# Patient Record
Sex: MALE | Race: WHITE
[De-identification: names, ages, dates, MRNs, and addresses within clinical notes are randomized per-mention and may not be internally consistent; named-entity substitution may affect disease eponyms.]

---

## 2020-12-28 ENCOUNTER — HOSPITAL ENCOUNTER (EMERGENCY)
Dept: HOSPITAL 41 - JD.ED | Age: 70
Discharge: HOME | End: 2020-12-28
Payer: MEDICARE

## 2020-12-28 VITALS — DIASTOLIC BLOOD PRESSURE: 80 MMHG | SYSTOLIC BLOOD PRESSURE: 131 MMHG | HEART RATE: 100 BPM

## 2020-12-28 DIAGNOSIS — D72.829: ICD-10-CM

## 2020-12-28 DIAGNOSIS — Z79.899: ICD-10-CM

## 2020-12-28 DIAGNOSIS — R74.8: ICD-10-CM

## 2020-12-28 DIAGNOSIS — R79.82: ICD-10-CM

## 2020-12-28 DIAGNOSIS — J40: Primary | ICD-10-CM

## 2020-12-28 DIAGNOSIS — Z20.828: ICD-10-CM

## 2020-12-28 DIAGNOSIS — R79.1: ICD-10-CM

## 2020-12-28 DIAGNOSIS — I10: ICD-10-CM

## 2020-12-28 LAB — SARS-COV-2 RNA RESP QL NAA+PROBE: NEGATIVE

## 2020-12-28 PROCEDURE — 83605 ASSAY OF LACTIC ACID: CPT

## 2020-12-28 PROCEDURE — 85025 COMPLETE CBC W/AUTO DIFF WBC: CPT

## 2020-12-28 PROCEDURE — 0240U: CPT

## 2020-12-28 PROCEDURE — 71275 CT ANGIOGRAPHY CHEST: CPT

## 2020-12-28 PROCEDURE — 96365 THER/PROPH/DIAG IV INF INIT: CPT

## 2020-12-28 PROCEDURE — 83615 LACTATE (LD) (LDH) ENZYME: CPT

## 2020-12-28 PROCEDURE — 94640 AIRWAY INHALATION TREATMENT: CPT

## 2020-12-28 PROCEDURE — 82728 ASSAY OF FERRITIN: CPT

## 2020-12-28 PROCEDURE — 96375 TX/PRO/DX INJ NEW DRUG ADDON: CPT

## 2020-12-28 PROCEDURE — 85379 FIBRIN DEGRADATION QUANT: CPT

## 2020-12-28 PROCEDURE — 36415 COLL VENOUS BLD VENIPUNCTURE: CPT

## 2020-12-28 PROCEDURE — 71045 X-RAY EXAM CHEST 1 VIEW: CPT

## 2020-12-28 PROCEDURE — 87040 BLOOD CULTURE FOR BACTERIA: CPT

## 2020-12-28 PROCEDURE — 86140 C-REACTIVE PROTEIN: CPT

## 2020-12-28 PROCEDURE — 80053 COMPREHEN METABOLIC PANEL: CPT

## 2020-12-28 PROCEDURE — 99285 EMERGENCY DEPT VISIT HI MDM: CPT

## 2020-12-28 NOTE — CT
CT chest

 

Technique: Multiple axial sections were obtained from above the lung 

apices inferiorly through the lung bases.  Intravenous contrast was 

utilized.  Study was performed as a pulmonary angiogram protocol.

 

Findings: Pulmonary arteries are not optimally opacified for pulmonary

 embolism evaluation.  No gross findings of pulmonary embolism within 

the main pulmonary arteries.  Smaller pulmonary embolism could be 

missed.  

 

Heart is enlarged but shows no pericardial thickening.  Thoracic aorta

 shows no aneurysm.  No mediastinal adenopathy or hilar adenopathy is 

appreciated.  Small cyst is noted within the right lobe of the liver 

measuring 2.6 cm in size.  Liver also shows mild fatty infiltration.  

 

Large calcified gallstone is noted within the gallbladder.  Spleen is 

enlarged extending below the inferior level of the study.  Spleen size

 is difficult to accurately measure since it is not completely seen.

 

Lung window settings were reviewed which show no acute parenchymal 

change.

 

Bone window settings were reviewed which show mild degenerative change

 within the spine.  No acute osseous finding is appreciated.

 

Impression:

1.  Suboptimal opacification of the pulmonary arteries.  No findings 

of pulmonary embolism within the main pulmonary arteries.

2.  Fatty infiltration within the liver with right liver cyst.

3.  Large calcified gallstone within the gallbladder.

4.  Enlarged spleen which is nonspecific.

5.  Other findings believed to be incidental as noted above.

 

Diagnostic code #3

 

I agree with preliminary report from [facility], finalized on 12/28/20, 5:47

 AM CST

## 2020-12-28 NOTE — CR
Chest: Portable view of the chest was obtained.

 

Comparison: No prior chest x-ray is available.

 

Findings: Heart is not enlarged.  Minimal atelectasis is seen off the 

left cardiac apex.  Lungs show no acute parenchymal change.  Bony 

structures are grossly intact.

 

Impression:

1.  Minimal atelectasis.

2.  Nothing acute is otherwise appreciated.

 

Diagnostic code #2

## 2020-12-28 NOTE — EDM.PDOC
ED HPI GENERAL MEDICAL PROBLEM





- General


Chief Complaint: Respiratory Problem


Stated Complaint: [facility]


Time Seen by Provider: 12/28/20 01:50


Source of Information: Reports: Patient, EMS


History Limitations: Reports: No Limitations





- History of Present Illness


INITIAL COMMENTS - FREE TEXT/NARRATIVE: 





The patient presents by [facility] for a cough and shortness of breath. 

Last week he developed cellulitis in his right little finger.  He was on kelfex.

 He then started having a temp and a cough a few days ago.  He has been short of

breath but had no loss of taste or smell.  He has no chest pain.  He has no 

abdominal pain, nausea or vomiting.  His wife has a pulse oximeter and she 

checked it this morning and his oxygen saturations were in the upper 80s.  EMS 

was called and they checked his oxygen saturations and they were in the upper 

90s.  He did well with 2L of oxygen.  He has not been tested for COVID.  He does

have prostate cancer and was treated last year.


Onset: Gradual


Duration: Day(s):


Severity: Moderate


Improves with: Reports: None


Worsens with: Reports: None


Associated Symptoms: Reports: Cough, Fever/Chills, Shortness of Breath.  Denies:

Chest Pain, Headaches, Nausea/Vomiting





- Related Data


                                    Allergies











Allergy/AdvReac Type Severity Reaction Status Date / Time


 


No Known Allergies Allergy   Verified 12/28/20 01:54











Home Meds: 


                                    Home Meds





Losartan Potassium 100 mg PO DAILY 12/28/20 [History]


amLODIPine [Norvasc] 5 mg PO DAILY 12/28/20 [History]


cephALEXin [Keflex] 500 mg PO QID 12/28/20 [History]


hydroCHLOROthiazide [Hydrochlorothiazide] 12.5 mg PO DAILY 12/28/20 [History]


predniSONE [Prednisone] 40 mg PO DAILY #10 tablet 12/28/20 [Rx]











Past Medical History


Cardiovascular History: Reports: Hypertension





- Past Surgical History


Musculoskeletal Surgical History: Reports: Other (See Below)


Other Musculoskeletal Surgeries/Procedures:: I&D foot





Social & Family History





- Family History


Family Medical History: No Pertinent Family History





- Tobacco Use


Tobacco Use Status *Q: Never Tobacco User


Second Hand Smoke Exposure: No





- Caffeine Use


Caffeine Use: Reports: Coffee





- Recreational Drug Use


Recreational Drug Use: No





ED ROS GENERAL





- Review of Systems


Review Of Systems: See Below


Constitutional: Reports: Fever, Chills


HEENT: Reports: No Symptoms


Respiratory: Reports: Shortness of Breath, Cough


Cardiovascular: Reports: No Symptoms


Endocrine: Reports: No Symptoms


GI/Abdominal: Reports: No Symptoms


: Reports: No Symptoms


Musculoskeletal: Reports: No Symptoms





ED EXAM, GENERAL





- Physical Exam


Exam: See Below


Exam Limited By: No Limitations


General Appearance: Alert, No Apparent Distress


Ears: Normal External Exam


Nose: Normal Inspection


Head: Atraumatic, Normocephalic


Neck: Normal Inspection


Respiratory/Chest: No Respiratory Distress, Lungs Clear, Normal Breath Sounds


Cardiovascular: Regular Rate, Rhythm, No Edema, No Murmur


GI/Abdominal: Soft, Non-Tender, No Organomegaly, No Mass


Back Exam: Normal Inspection


Extremities: Normal Inspection





Course





- Vital Signs


Last Recorded V/S: 


                                Last Vital Signs











Temp  98.9 F   12/28/20 01:50


 


Pulse  100   12/28/20 01:50


 


Resp  17   12/28/20 01:50


 


BP  131/80   12/28/20 01:50


 


Pulse Ox  95   12/28/20 04:26














- Orders/Labs/Meds


Orders: 


                               Active Orders 24 hr











 Category Date Time Status


 


 Cardiac Monitoring [RC] .AS DIRECTED Care  12/28/20 01:57 Active


 


 Oxygen Therapy [RC] PRN Care  12/28/20 01:58 Active


 


 Peripheral IV Care [RC] .AS DIRECTED Care  12/28/20 01:58 Active


 


 RT Post Treatment Assessment [RC] Click to Edit Care  12/28/20 03:35 Active


 


 RT Pre-Treatment Assessment [RC] Click to Edit Care  12/28/20 03:35 Active


 


 Ang Chest [CT] Stat Exams  12/28/20 03:28 Taken


 


 Chest 1V Frontal [CR] Stat Exams  12/28/20 01:58 Taken


 


 CULTURE BLOOD [BC] Stat Lab  12/28/20 03:50 Received


 


 CULTURE BLOOD [BC] Stat Lab  12/28/20 03:55 Received


 


 Sodium Chloride 0.9% [Normal Saline] 100 ml Med  12/28/20 04:00 Active





 IV ASDIRECTED   


 


 Sodium Chloride 0.9% [Saline Flush] Med  12/28/20 01:57 Active





 10 ml FLUSH ASDIRECTED PRN   


 


 Blood Culture x2 Reflex Set [OM.PC] Stat Oth  12/28/20 03:29 Ordered


 


 Peripheral IV Insertion Adult [OM.PC] Stat Oth  12/28/20 01:57 Ordered








                                Medication Orders





Sodium Chloride (Normal Saline)  100 mls @ 100 mls/min IV ASDIRECTED JOHN


   Last Admin: 12/28/20 04:26  Dose: 100 mls/min


   Documented by: ALPA


Sodium Chloride (Saline Flush)  10 ml FLUSH ASDIRECTED PRN


   PRN Reason: Keep Vein Open


   Last Admin: 12/28/20 02:13  Dose: 10 ml


   Documented by: SHELLIE








Labs: 


                                Laboratory Tests











  12/28/20 12/28/20 12/28/20 Range/Units





  02:10 02:10 02:10 


 


WBC  10.16 H    (4.23-9.07)  K/mm3


 


RBC  3.53 L    (4.63-6.08)  M/mm3


 


Hgb  11.4 L    (13.7-17.5)  gm/dl


 


Hct  34.8 L    (40.1-51.0)  %


 


MCV  98.6 H    (79.0-92.2)  fl


 


MCH  32.3 H    (25.7-32.2)  pg


 


MCHC  32.8    (32.2-35.5)  g/dl


 


RDW Std Deviation  48.0 H    (35.1-43.9)  fL


 


Plt Count  137 L    (163-337)  K/mm3


 


MPV  9.8    (9.4-12.3)  fl


 


Neut % (Auto)  81.7 H    (34.0-67.9)  %


 


Lymph % (Auto)  7.3 L    (21.8-53.1)  %


 


Mono % (Auto)  7.6    (5.3-12.2)  %


 


Eos % (Auto)  2.1    (0.8-7.0)  


 


Baso % (Auto)  0.8    (0.1-1.2)  %


 


Neut # (Auto)  8.31 H    (1.78-5.38)  K/mm3


 


Lymph # (Auto)  0.74 L    (1.32-3.57)  K/mm3


 


Mono # (Auto)  0.77    (0.30-0.82)  K/mm3


 


Eos # (Auto)  0.21    (0.04-0.54)  K/mm3


 


Baso # (Auto)  0.08    (0.01-0.08)  K/mm3


 


Manual Slide Review  Abnormal smear    


 


D-Dimer, Quantitative   4.21 H   (0.19-0.50)  mg/L


 


Sodium    134 L  (136-145)  mEq/L


 


Potassium    3.4 L  (3.5-5.1)  mEq/L


 


Chloride    98  ()  mEq/L


 


Carbon Dioxide    30  (21-32)  mEq/L


 


Anion Gap    9.4  (5-15)  


 


BUN    23 H  (7-18)  mg/dL


 


Creatinine    1.8 H  (0.7-1.3)  mg/dL


 


Est Cr Clr Drug Dosing    TNP  


 


Estimated GFR (MDRD)    38  (>60)  mL/min


 


BUN/Creatinine Ratio    12.8 L  (14-18)  


 


Glucose    133 H  ()  mg/dL


 


Lactic Acid     (0.4-2.0)  mmol/L


 


Calcium    8.7  (8.5-10.1)  mg/dL


 


Ferritin     ()  ng/ml


 


Total Bilirubin    1.8 H  (0.2-1.0)  mg/dL


 


AST    38 H  (15-37)  U/L


 


ALT    135 H  (16-63)  U/L


 


Alkaline Phosphatase    171 H  ()  U/L


 


Lactate Dehydrogenase    198  ()  U/L


 


C-Reactive Protein    14.8 H*  (<1.0)  mg/dL


 


Total Protein    7.1  (6.4-8.2)  g/dl


 


Albumin    2.9 L  (3.4-5.0)  g/dl


 


Globulin    4.2  gm/dL


 


Albumin/Globulin Ratio    0.7 L  (1-2)  


 


Influenza Type A RNA     (NEGATIVE)  


 


Influenza Type B RNA     (NEGATIVE)  


 


SARS-CoV-2 RNA (SILVANO)     (NEGATIVE)  














  12/28/20 12/28/20 12/28/20 Range/Units





  02:10 02:15 03:50 


 


WBC     (4.23-9.07)  K/mm3


 


RBC     (4.63-6.08)  M/mm3


 


Hgb     (13.7-17.5)  gm/dl


 


Hct     (40.1-51.0)  %


 


MCV     (79.0-92.2)  fl


 


MCH     (25.7-32.2)  pg


 


MCHC     (32.2-35.5)  g/dl


 


RDW Std Deviation     (35.1-43.9)  fL


 


Plt Count     (163-337)  K/mm3


 


MPV     (9.4-12.3)  fl


 


Neut % (Auto)     (34.0-67.9)  %


 


Lymph % (Auto)     (21.8-53.1)  %


 


Mono % (Auto)     (5.3-12.2)  %


 


Eos % (Auto)     (0.8-7.0)  


 


Baso % (Auto)     (0.1-1.2)  %


 


Neut # (Auto)     (1.78-5.38)  K/mm3


 


Lymph # (Auto)     (1.32-3.57)  K/mm3


 


Mono # (Auto)     (0.30-0.82)  K/mm3


 


Eos # (Auto)     (0.04-0.54)  K/mm3


 


Baso # (Auto)     (0.01-0.08)  K/mm3


 


Manual Slide Review     


 


D-Dimer, Quantitative     (0.19-0.50)  mg/L


 


Sodium     (136-145)  mEq/L


 


Potassium     (3.5-5.1)  mEq/L


 


Chloride     ()  mEq/L


 


Carbon Dioxide     (21-32)  mEq/L


 


Anion Gap     (5-15)  


 


BUN     (7-18)  mg/dL


 


Creatinine     (0.7-1.3)  mg/dL


 


Est Cr Clr Drug Dosing     


 


Estimated GFR (MDRD)     (>60)  mL/min


 


BUN/Creatinine Ratio     (14-18)  


 


Glucose     ()  mg/dL


 


Lactic Acid    1.0  (0.4-2.0)  mmol/L


 


Calcium     (8.5-10.1)  mg/dL


 


Ferritin  1555 H    ()  ng/ml


 


Total Bilirubin     (0.2-1.0)  mg/dL


 


AST     (15-37)  U/L


 


ALT     (16-63)  U/L


 


Alkaline Phosphatase     ()  U/L


 


Lactate Dehydrogenase     ()  U/L


 


C-Reactive Protein     (<1.0)  mg/dL


 


Total Protein     (6.4-8.2)  g/dl


 


Albumin     (3.4-5.0)  g/dl


 


Globulin     gm/dL


 


Albumin/Globulin Ratio     (1-2)  


 


Influenza Type A RNA   Negative   (NEGATIVE)  


 


Influenza Type B RNA   Negative   (NEGATIVE)  


 


SARS-CoV-2 RNA (SILVANO)   Negative   (NEGATIVE)  











Meds: 


Medications











Generic Name Dose Route Start Last Admin





  Trade Name Freq  PRN Reason Stop Dose Admin


 


Sodium Chloride  100 mls @ 100 mls/min  12/28/20 04:00  12/28/20 04:26





  Normal Saline  IV   100 mls/min





  ASDIRECTED JOHN   Administration


 


Sodium Chloride  10 ml  12/28/20 01:57  12/28/20 02:13





  Saline Flush  FLUSH   10 ml





  ASDIRECTED PRN   Administration





  Keep Vein Open  














Discontinued Medications














Generic Name Dose Route Start Last Admin





  Trade Name Ricq  PRN Reason Stop Dose Admin


 


Albuterol  0 gm  12/28/20 03:34  12/28/20 04:24





  Proventil Hfa  INH  12/28/20 03:35  2 dose





  ONETIME ONE   Administration


 


Sodium Chloride  1,000 mls @ 1,000 mls/hr  12/28/20 03:28  12/28/20 03:30





  Normal Saline  IV  12/28/20 04:27  1,000 mls/hr





  ONETIME ONE   Administration


 


Ceftriaxone Sodium 2 gm/  100 mls @ 200 mls/hr  12/28/20 03:29  12/28/20 04:22





  Sodium Chloride  IV  12/28/20 03:58  200 mls/hr





  ONETIME ONE   Administration


 


Iopamidol  100 ml  12/28/20 03:52  12/28/20 04:26





  Isovue-370 (76%)  IVPUSH  12/28/20 03:53  100 ml





  ONETIME ONE   Administration














- Re-Assessments/Exams


Free Text/Narrative Re-Assessment/Exam: 





12/28/20 04:55


I ordered oxygen PRN, IV saline lock, CXR, labs and COVID 19.  His WBC was 

slightly elevated at 10.16.  His Hgb is a little low at 11.4.  His D-dimer is 

elevated at 4.21.  His Na is a little low at 134.  His K is low at 3.4.  His 

creatinine is elevated at 1.8 with a low GFR of 38.  His glucose was 133.  His 

ferritin is elevated at 1555.  His LDH was normal.  His total bili is elevated 

at 1.8.  His AST is elevated at 38.  His ALT is elevated at 135.  His Alk Phos 

is elevated at 171.  His CRP is elevated at 14.8.  His CXR did look like 

pneumonia in the RLL.  I ordered blood cultures, lactic acid and rocphin 2 grams

 IV.  His D-dimer is also elevated.  I ordered a CT angio of his chest.  His 

kidneys are not working so good so I ordered a liter of NS.  COVID 19 and 

influenza are negative.





The CT of his chest shows no definite evidence for acute pulmonary embolic 

disease, although the study is somewhat limited due to suboptimal contrast bolus

 administration.  Gallstone in the gallbladder lumen without acute cholec

ystitis.  Fatty infiltration of the liver.  Splenomegaly.





There is no pneumonia on CT.  I feel he has some bronchitis going on.  I will 

get him an inhaler and prednisone.  I will give him a dose of prednisone here.  

He is also on keflex for his finger.





Departure





- Departure


Time of Disposition: 05:10


Disposition: Home, Self-Care 01


Condition: Good


Clinical Impression: 


 Bronchitis








- Discharge Information


*PRESCRIPTION DRUG MONITORING PROGRAM REVIEWED*: Not Applicable


*COPY OF PRESCRIPTION DRUG MONITORING REPORT IN PATIENT FÁTIMA: Not Applicable


Prescriptions: 


predniSONE [Prednisone] 40 mg PO DAILY #10 tablet


Referrals: 


Jaun Iyer MD [Ordering Only Provider] - 1 Week


Forms:  ED Department Discharge


Additional Instructions: 


Keep taking the keflex for your finger.  Use the albuterol 2 puffs every 6 hours

as needed for shortness of breath.  Take the prednisone 40mg daily for 5 days.  

Follow up with Dr Iyer within a week.  Please return if you are worse.





Sepsis Event Note (ED)





- Evaluation


Sepsis Screening Result: Possible Sepsis Risk





- Focused Exam


Vital Signs: 


                                   Vital Signs











  Temp Pulse Resp BP Pulse Ox Pulse Ox


 


 12/28/20 04:26       95


 


 12/28/20 02:26       97


 


 12/28/20 01:50  98.9 F  100  17  131/80  94 L 














- My Orders


Last 24 Hours: 


My Active Orders





12/28/20 01:57


Cardiac Monitoring [RC] .AS DIRECTED 


Sodium Chloride 0.9% [Saline Flush]   10 ml FLUSH ASDIRECTED PRN 


Peripheral IV Insertion Adult [OM.PC] Stat 





12/28/20 01:58


Oxygen Therapy [RC] PRN 


Peripheral IV Care [RC] .AS DIRECTED 


Chest 1V Frontal [CR] Stat 





12/28/20 03:28


Ang Chest [CT] Stat 





12/28/20 03:29


Blood Culture x2 Reflex Set [OM.PC] Stat 





12/28/20 03:35


RT Post Treatment Assessment [RC] Click to Edit 


RT Pre-Treatment Assessment [RC] Click to Edit 





12/28/20 03:50


CULTURE BLOOD [BC] Stat 





12/28/20 03:55


CULTURE BLOOD [BC] Stat 





12/28/20 04:00


Sodium Chloride 0.9% [Normal Saline] 100 ml IV ASDIRECTED 














- Assessment/Plan


Last 24 Hours: 


My Active Orders





12/28/20 01:57


Cardiac Monitoring [RC] .AS DIRECTED 


Sodium Chloride 0.9% [Saline Flush]   10 ml FLUSH ASDIRECTED PRN 


Peripheral IV Insertion Adult [OM.PC] Stat 





12/28/20 01:58


Oxygen Therapy [RC] PRN 


Peripheral IV Care [RC] .AS DIRECTED 


Chest 1V Frontal [CR] Stat 





12/28/20 03:28


Ang Chest [CT] Stat 





12/28/20 03:29


Blood Culture x2 Reflex Set [OM.PC] Stat 





12/28/20 03:35


RT Post Treatment Assessment [RC] Click to Edit 


RT Pre-Treatment Assessment [RC] Click to Edit 





12/28/20 03:50


CULTURE BLOOD [BC] Stat 





12/28/20 03:55


CULTURE BLOOD [BC] Stat 





12/28/20 04:00


Sodium Chloride 0.9% [Normal Saline] 100 ml IV ASDIRECTED